# Patient Record
Sex: MALE | Race: BLACK OR AFRICAN AMERICAN | NOT HISPANIC OR LATINO | Employment: FULL TIME | URBAN - METROPOLITAN AREA
[De-identification: names, ages, dates, MRNs, and addresses within clinical notes are randomized per-mention and may not be internally consistent; named-entity substitution may affect disease eponyms.]

---

## 2024-09-06 ENCOUNTER — APPOINTMENT (OUTPATIENT)
Dept: CARDIOLOGY | Facility: HOSPITAL | Age: 34
End: 2024-09-06

## 2024-09-06 ENCOUNTER — HOSPITAL ENCOUNTER (EMERGENCY)
Facility: HOSPITAL | Age: 34
Discharge: OTHER INSTITUTION | End: 2024-09-07
Attending: EMERGENCY MEDICINE

## 2024-09-06 DIAGNOSIS — R11.2 NAUSEA AND VOMITING, UNSPECIFIED VOMITING TYPE: ICD-10-CM

## 2024-09-06 DIAGNOSIS — F10.929 ALCOHOLIC INTOXICATION WITH COMPLICATION (CMS-HCC): ICD-10-CM

## 2024-09-06 DIAGNOSIS — R45.851 SUICIDAL IDEATION: Primary | ICD-10-CM

## 2024-09-06 DIAGNOSIS — F10.10 ALCOHOL ABUSE: ICD-10-CM

## 2024-09-06 LAB
ALBUMIN SERPL-MCNC: 4.5 G/DL (ref 3.5–5)
ALP BLD-CCNC: 94 U/L (ref 35–125)
ALT SERPL-CCNC: 19 U/L (ref 5–40)
AMPHETAMINES UR QL SCN>1000 NG/ML: NEGATIVE
ANION GAP SERPL CALC-SCNC: >19 MMOL/L
APPEARANCE UR: CLEAR
AST SERPL-CCNC: 34 U/L (ref 5–40)
BARBITURATES UR QL SCN>300 NG/ML: POSITIVE
BASOPHILS # BLD MANUAL: 0 X10*3/UL (ref 0–0.1)
BASOPHILS NFR BLD MANUAL: 0 %
BENZODIAZ UR QL SCN>300 NG/ML: NEGATIVE
BILIRUB SERPL-MCNC: 0.2 MG/DL (ref 0.1–1.2)
BILIRUB UR STRIP.AUTO-MCNC: NEGATIVE MG/DL
BUN SERPL-MCNC: 12 MG/DL (ref 8–25)
BZE UR QL SCN>300 NG/ML: NEGATIVE
CALCIUM SERPL-MCNC: 8.4 MG/DL (ref 8.5–10.4)
CANNABINOIDS UR QL SCN>50 NG/ML: POSITIVE
CHLORIDE SERPL-SCNC: 101 MMOL/L (ref 97–107)
CO2 SERPL-SCNC: 21 MMOL/L (ref 24–31)
COLOR UR: ABNORMAL
CREAT SERPL-MCNC: 0.9 MG/DL (ref 0.4–1.6)
EGFRCR SERPLBLD CKD-EPI 2021: >90 ML/MIN/1.73M*2
EOSINOPHIL # BLD MANUAL: 0.52 X10*3/UL (ref 0–0.7)
EOSINOPHIL NFR BLD MANUAL: 5 %
ERYTHROCYTE [DISTWIDTH] IN BLOOD BY AUTOMATED COUNT: 11.9 % (ref 11.5–14.5)
ETHANOL SERPL-MCNC: 0.05 G/DL
ETHANOL SERPL-MCNC: 0.41 G/DL
FENTANYL+NORFENTANYL UR QL SCN: NEGATIVE
GLUCOSE SERPL-MCNC: 78 MG/DL (ref 65–99)
GLUCOSE UR STRIP.AUTO-MCNC: NORMAL MG/DL
HCT VFR BLD AUTO: 45.6 % (ref 41–52)
HGB BLD-MCNC: 16 G/DL (ref 13.5–17.5)
HOLD SPECIMEN: NORMAL
IMM GRANULOCYTES # BLD AUTO: 0.02 X10*3/UL (ref 0–0.7)
IMM GRANULOCYTES NFR BLD AUTO: 0.2 % (ref 0–0.9)
KETONES UR STRIP.AUTO-MCNC: ABNORMAL MG/DL
LEUKOCYTE ESTERASE UR QL STRIP.AUTO: NEGATIVE
LIPASE SERPL-CCNC: 50 U/L (ref 16–63)
LYMPHOCYTES # BLD MANUAL: 4.74 X10*3/UL (ref 1.2–4.8)
LYMPHOCYTES NFR BLD MANUAL: 46 %
MCH RBC QN AUTO: 31 PG (ref 26–34)
MCHC RBC AUTO-ENTMCNC: 35.1 G/DL (ref 32–36)
MCV RBC AUTO: 88 FL (ref 80–100)
METHADONE UR QL SCN>300 NG/ML: NEGATIVE
MONOCYTES # BLD MANUAL: 0.41 X10*3/UL (ref 0.1–1)
MONOCYTES NFR BLD MANUAL: 4 %
MUCOUS THREADS #/AREA URNS AUTO: NORMAL /LPF
NEUTS SEG # BLD MANUAL: 4.12 X10*3/UL (ref 1.2–7)
NEUTS SEG NFR BLD MANUAL: 40 %
NITRITE UR QL STRIP.AUTO: NEGATIVE
NRBC BLD-RTO: 0 /100 WBCS (ref 0–0)
OPIATES UR QL SCN>300 NG/ML: NEGATIVE
OXYCODONE UR QL: NEGATIVE
PCP UR QL SCN>25 NG/ML: NEGATIVE
PH UR STRIP.AUTO: 5 [PH]
PLATELET # BLD AUTO: 376 X10*3/UL (ref 150–450)
POTASSIUM SERPL-SCNC: 3.8 MMOL/L (ref 3.4–5.1)
PROT SERPL-MCNC: 7.5 G/DL (ref 5.9–7.9)
PROT UR STRIP.AUTO-MCNC: ABNORMAL MG/DL
RBC # BLD AUTO: 5.16 X10*6/UL (ref 4.5–5.9)
RBC # UR STRIP.AUTO: NEGATIVE /UL
RBC #/AREA URNS AUTO: NORMAL /HPF
RBC MORPH BLD: ABNORMAL
SODIUM SERPL-SCNC: 144 MMOL/L (ref 133–145)
SP GR UR STRIP.AUTO: 1.02
TOTAL CELLS COUNTED BLD: 100
UROBILINOGEN UR STRIP.AUTO-MCNC: NORMAL MG/DL
VARIANT LYMPHS # BLD MANUAL: 0.52 X10*3/UL (ref 0–0.5)
VARIANT LYMPHS NFR BLD: 5 %
WBC # BLD AUTO: 10.3 X10*3/UL (ref 4.4–11.3)
WBC #/AREA URNS AUTO: NORMAL /HPF

## 2024-09-06 PROCEDURE — S4991 NICOTINE PATCH NONLEGEND: HCPCS | Performed by: EMERGENCY MEDICINE

## 2024-09-06 PROCEDURE — 90839 PSYTX CRISIS INITIAL 60 MIN: CPT

## 2024-09-06 PROCEDURE — 85007 BL SMEAR W/DIFF WBC COUNT: CPT | Performed by: EMERGENCY MEDICINE

## 2024-09-06 PROCEDURE — 2500000005 HC RX 250 GENERAL PHARMACY W/O HCPCS: Performed by: EMERGENCY MEDICINE

## 2024-09-06 PROCEDURE — 82077 ASSAY SPEC XCP UR&BREATH IA: CPT | Performed by: EMERGENCY MEDICINE

## 2024-09-06 PROCEDURE — 36415 COLL VENOUS BLD VENIPUNCTURE: CPT | Performed by: EMERGENCY MEDICINE

## 2024-09-06 PROCEDURE — 99285 EMERGENCY DEPT VISIT HI MDM: CPT | Mod: 25

## 2024-09-06 PROCEDURE — 81001 URINALYSIS AUTO W/SCOPE: CPT | Performed by: EMERGENCY MEDICINE

## 2024-09-06 PROCEDURE — 80307 DRUG TEST PRSMV CHEM ANLYZR: CPT | Performed by: EMERGENCY MEDICINE

## 2024-09-06 PROCEDURE — 80053 COMPREHEN METABOLIC PANEL: CPT | Performed by: EMERGENCY MEDICINE

## 2024-09-06 PROCEDURE — 96375 TX/PRO/DX INJ NEW DRUG ADDON: CPT

## 2024-09-06 PROCEDURE — 2500000001 HC RX 250 WO HCPCS SELF ADMINISTERED DRUGS (ALT 637 FOR MEDICARE OP): Performed by: EMERGENCY MEDICINE

## 2024-09-06 PROCEDURE — 93005 ELECTROCARDIOGRAM TRACING: CPT

## 2024-09-06 PROCEDURE — 85027 COMPLETE CBC AUTOMATED: CPT | Performed by: EMERGENCY MEDICINE

## 2024-09-06 PROCEDURE — 2500000004 HC RX 250 GENERAL PHARMACY W/ HCPCS (ALT 636 FOR OP/ED): Performed by: EMERGENCY MEDICINE

## 2024-09-06 PROCEDURE — 96361 HYDRATE IV INFUSION ADD-ON: CPT

## 2024-09-06 PROCEDURE — 2500000004 HC RX 250 GENERAL PHARMACY W/ HCPCS (ALT 636 FOR OP/ED)

## 2024-09-06 PROCEDURE — 83690 ASSAY OF LIPASE: CPT | Performed by: EMERGENCY MEDICINE

## 2024-09-06 PROCEDURE — 2500000002 HC RX 250 W HCPCS SELF ADMINISTERED DRUGS (ALT 637 FOR MEDICARE OP, ALT 636 FOR OP/ED): Performed by: EMERGENCY MEDICINE

## 2024-09-06 PROCEDURE — 96374 THER/PROPH/DIAG INJ IV PUSH: CPT

## 2024-09-06 RX ORDER — FOLIC ACID 1 MG/1
1 TABLET ORAL ONCE
Status: COMPLETED | OUTPATIENT
Start: 2024-09-06 | End: 2024-09-06

## 2024-09-06 RX ORDER — MULTIVIT-MIN/IRON FUM/FOLIC AC 7.5 MG-4
1 TABLET ORAL ONCE
Status: COMPLETED | OUTPATIENT
Start: 2024-09-06 | End: 2024-09-06

## 2024-09-06 RX ORDER — LANOLIN ALCOHOL/MO/W.PET/CERES
100 CREAM (GRAM) TOPICAL ONCE
Status: COMPLETED | OUTPATIENT
Start: 2024-09-06 | End: 2024-09-06

## 2024-09-06 RX ORDER — IBUPROFEN 200 MG
1 TABLET ORAL ONCE
Status: DISCONTINUED | OUTPATIENT
Start: 2024-09-06 | End: 2024-09-07 | Stop reason: HOSPADM

## 2024-09-06 RX ORDER — ONDANSETRON HYDROCHLORIDE 2 MG/ML
4 INJECTION, SOLUTION INTRAVENOUS ONCE
Status: COMPLETED | OUTPATIENT
Start: 2024-09-06 | End: 2024-09-06

## 2024-09-06 RX ORDER — ONDANSETRON 4 MG/1
4 TABLET, ORALLY DISINTEGRATING ORAL ONCE
Status: COMPLETED | OUTPATIENT
Start: 2024-09-06 | End: 2024-09-06

## 2024-09-06 RX ORDER — LORAZEPAM 0.5 MG/1
0.5 TABLET ORAL ONCE AS NEEDED
Status: DISCONTINUED | OUTPATIENT
Start: 2024-09-06 | End: 2024-09-07 | Stop reason: HOSPADM

## 2024-09-06 RX ORDER — LORAZEPAM 1 MG/1
2 TABLET ORAL ONCE AS NEEDED
Status: COMPLETED | OUTPATIENT
Start: 2024-09-06 | End: 2024-09-06

## 2024-09-06 RX ORDER — LORAZEPAM 1 MG/1
2 TABLET ORAL ONCE AS NEEDED
Status: DISCONTINUED | OUTPATIENT
Start: 2024-09-06 | End: 2024-09-07 | Stop reason: HOSPADM

## 2024-09-06 RX ORDER — FAMOTIDINE 10 MG/ML
20 INJECTION INTRAVENOUS ONCE
Status: COMPLETED | OUTPATIENT
Start: 2024-09-06 | End: 2024-09-06

## 2024-09-06 RX ORDER — ONDANSETRON HYDROCHLORIDE 2 MG/ML
INJECTION, SOLUTION INTRAVENOUS
Status: COMPLETED
Start: 2024-09-06 | End: 2024-09-06

## 2024-09-06 RX ORDER — LORAZEPAM 1 MG/1
1 TABLET ORAL ONCE AS NEEDED
Status: COMPLETED | OUTPATIENT
Start: 2024-09-06 | End: 2024-09-06

## 2024-09-06 RX ORDER — LORAZEPAM 1 MG/1
1 TABLET ORAL ONCE AS NEEDED
Status: DISCONTINUED | OUTPATIENT
Start: 2024-09-06 | End: 2024-09-07 | Stop reason: HOSPADM

## 2024-09-06 RX ORDER — LORAZEPAM 0.5 MG/1
0.5 TABLET ORAL ONCE AS NEEDED
Status: COMPLETED | OUTPATIENT
Start: 2024-09-06 | End: 2024-09-06

## 2024-09-06 RX ADMIN — Medication 1 PATCH: at 20:08

## 2024-09-06 RX ADMIN — LORAZEPAM 2 MG: 1 TABLET ORAL at 19:54

## 2024-09-06 RX ADMIN — Medication 1 TABLET: at 06:32

## 2024-09-06 RX ADMIN — ONDANSETRON HYDROCHLORIDE 4 MG: 2 INJECTION, SOLUTION INTRAVENOUS at 01:59

## 2024-09-06 RX ADMIN — FOLIC ACID 1 MG: 1 TABLET ORAL at 06:32

## 2024-09-06 RX ADMIN — LORAZEPAM 1 MG: 1 TABLET ORAL at 21:55

## 2024-09-06 RX ADMIN — SODIUM CHLORIDE 1000 ML: 900 INJECTION, SOLUTION INTRAVENOUS at 02:38

## 2024-09-06 RX ADMIN — Medication 100 MG: at 06:32

## 2024-09-06 RX ADMIN — LORAZEPAM 2 MG: 1 TABLET ORAL at 18:57

## 2024-09-06 RX ADMIN — LORAZEPAM 1 MG: 1 TABLET ORAL at 06:32

## 2024-09-06 RX ADMIN — LORAZEPAM 0.5 MG: 0.5 TABLET ORAL at 16:04

## 2024-09-06 RX ADMIN — FAMOTIDINE 20 MG: 10 INJECTION, SOLUTION INTRAVENOUS at 02:37

## 2024-09-06 RX ADMIN — ONDANSETRON 4 MG: 2 INJECTION INTRAMUSCULAR; INTRAVENOUS at 01:59

## 2024-09-06 RX ADMIN — ONDANSETRON 4 MG: 4 TABLET, ORALLY DISINTEGRATING ORAL at 20:07

## 2024-09-06 RX ADMIN — LORAZEPAM 0.5 MG: 0.5 TABLET ORAL at 13:49

## 2024-09-06 SDOH — HEALTH STABILITY: MENTAL HEALTH: HAVE YOU EVER DONE ANYTHING, STARTED TO DO ANYTHING, OR PREPARED TO DO ANYTHING TO END YOUR LIFE?: NO

## 2024-09-06 SDOH — HEALTH STABILITY: MENTAL HEALTH: HAVE YOU WISHED YOU WERE DEAD OR WISHED YOU COULD GO TO SLEEP AND NOT WAKE UP?: YES

## 2024-09-06 SDOH — HEALTH STABILITY: MENTAL HEALTH: SUICIDE ASSESSMENT: ADULT (C-SSRS)

## 2024-09-06 SDOH — HEALTH STABILITY: MENTAL HEALTH: ACTIVE SUICIDAL IDEATION WITH SPECIFIC PLAN AND INTENT (PAST 1 MONTH): NO

## 2024-09-06 SDOH — HEALTH STABILITY: MENTAL HEALTH: NON-SPECIFIC ACTIVE SUICIDAL THOUGHTS (PAST 1 MONTH): NO

## 2024-09-06 SDOH — HEALTH STABILITY: MENTAL HEALTH: NEEDS EXPRESSED: OTHER (COMMENT)

## 2024-09-06 SDOH — HEALTH STABILITY: MENTAL HEALTH: ARE YOU HAVING THOUGHTS OF KILLING YOURSELF RIGHT NOW?: NO

## 2024-09-06 SDOH — HEALTH STABILITY: MENTAL HEALTH: HAVE YOU ACTUALLY HAD ANY THOUGHTS OF KILLING YOURSELF?: NO

## 2024-09-06 SDOH — HEALTH STABILITY: MENTAL HEALTH

## 2024-09-06 SDOH — ECONOMIC STABILITY: HOUSING INSECURITY: FEELS SAFE LIVING IN HOME: YES

## 2024-09-06 SDOH — HEALTH STABILITY: MENTAL HEALTH: SUICIDAL BEHAVIOR (LIFETIME): NO

## 2024-09-06 SDOH — HEALTH STABILITY: MENTAL HEALTH: IN THE PAST WEEK, HAVE YOU BEEN HAVING THOUGHTS ABOUT KILLING YOURSELF?: NO

## 2024-09-06 SDOH — HEALTH STABILITY: MENTAL HEALTH: IN THE PAST FEW WEEKS, HAVE YOU FELT THAT YOU OR YOUR FAMILY WOULD BE BETTER OFF IF YOU WERE DEAD?: NO

## 2024-09-06 SDOH — HEALTH STABILITY: MENTAL HEALTH: IN THE PAST FEW WEEKS, HAVE YOU WISHED YOU WERE DEAD?: NO

## 2024-09-06 SDOH — HEALTH STABILITY: MENTAL HEALTH: BEHAVIORS/MOOD: TEARFUL;COOPERATIVE;CALM

## 2024-09-06 SDOH — HEALTH STABILITY: MENTAL HEALTH: WISH TO BE DEAD (PAST 1 MONTH): NO

## 2024-09-06 SDOH — HEALTH STABILITY: MENTAL HEALTH: CONTENT: BLAMING SELF

## 2024-09-06 SDOH — HEALTH STABILITY: MENTAL HEALTH: HAVE YOU EVER TRIED TO KILL YOURSELF?: NO

## 2024-09-06 SDOH — HEALTH STABILITY: MENTAL HEALTH: ACTIVE SUICIDAL IDEATION WITH SOME INTENT TO ACT, WITHOUT SPECIFIC PLAN (PAST 1 MONTH): NO

## 2024-09-06 ASSESSMENT — LIFESTYLE VARIABLES
TREMOR: MODERATE, WITH PATIENT'S ARMS EXTENDED
PAROXYSMAL SWEATS: BARELY PERCEPTIBLE SWEATING, PALMS MOIST
ANXIETY: MODERATELY ANXIOUS, OR GUARDED, SO ANXIETY IS INFERRED
AGITATION: NORMAL ACTIVITY
TREMOR: MODERATE, WITH PATIENT'S ARMS EXTENDED
TREMOR: MODERATE, WITH PATIENT'S ARMS EXTENDED
TREMOR: 2
HEADACHE, FULLNESS IN HEAD: NOT PRESENT
VISUAL DISTURBANCES: NOT PRESENT
ANXIETY: NO ANXIETY, AT EASE
AUDITORY DISTURBANCES: NOT PRESENT
AUDITORY DISTURBANCES: NOT PRESENT
HEADACHE, FULLNESS IN HEAD: VERY MILD
PAROXYSMAL SWEATS: NO SWEAT VISIBLE
PRESCIPTION_ABUSE_PAST_12_MONTHS: NO
NAUSEA AND VOMITING: NO NAUSEA AND NO VOMITING
ANXIETY: MILDLY ANXIOUS
AUDITORY DISTURBANCES: NOT PRESENT
PAROXYSMAL SWEATS: NO SWEAT VISIBLE
AGITATION: NORMAL ACTIVITY
BLOOD PRESSURE: 114/65
NAUSEA AND VOMITING: 2
ORIENTATION AND CLOUDING OF SENSORIUM: ORIENTED AND CAN DO SERIAL ADDITIONS
AGITATION: NORMAL ACTIVITY
PAROXYSMAL SWEATS: NO SWEAT VISIBLE
TOTAL SCORE: 2
TREMOR: MODERATE, WITH PATIENT'S ARMS EXTENDED
TREMOR: NO TREMOR
TACTILE DISTURBANCES: VERY MILD ITCHING, PINS AND NEEDLES, BURNING OR NUMBNESS
PULSE: 91
NAUSEA AND VOMITING: 3
BLOOD PRESSURE: 130/87
AUDITORY DISTURBANCES: NOT PRESENT
HEADACHE, FULLNESS IN HEAD: MILD
ANXIETY: 2
VISUAL DISTURBANCES: MODERATELY SEVERE HALLUCINATIONS
BLOOD PRESSURE: 115/62
PAROXYSMAL SWEATS: NO SWEAT VISIBLE
ANXIETY: NO ANXIETY, AT EASE
TOTAL SCORE: 12
PULSE: 103
BLOOD PRESSURE: 116/74
AGITATION: NORMAL ACTIVITY
AGITATION: NORMAL ACTIVITY
ORIENTATION AND CLOUDING OF SENSORIUM: ORIENTED AND CAN DO SERIAL ADDITIONS
NAUSEA AND VOMITING: MILD NAUSEA WITH NO VOMITING
AUDITORY DISTURBANCES: NOT PRESENT
AUDITORY DISTURBANCES: NOT PRESENT
TREMOR: 3
HEADACHE, FULLNESS IN HEAD: NOT PRESENT
ORIENTATION AND CLOUDING OF SENSORIUM: ORIENTED AND CAN DO SERIAL ADDITIONS
PAROXYSMAL SWEATS: NO SWEAT VISIBLE
PULSE: 93
ANXIETY: NO ANXIETY, AT EASE
HEADACHE, FULLNESS IN HEAD: VERY MILD
HEADACHE, FULLNESS IN HEAD: MILD
PULSE: 87
AGITATION: NORMAL ACTIVITY
ANXIETY: 3
VISUAL DISTURBANCES: MILD SENSITIVITY
PAROXYSMAL SWEATS: BARELY PERCEPTIBLE SWEATING, PALMS MOIST
HEADACHE, FULLNESS IN HEAD: NOT PRESENT
PULSE: 75
TOTAL SCORE: 11
AGITATION: NORMAL ACTIVITY
VISUAL DISTURBANCES: NOT PRESENT
TREMOR: MODERATE, WITH PATIENT'S ARMS EXTENDED
VISUAL DISTURBANCES: NOT PRESENT
ORIENTATION AND CLOUDING OF SENSORIUM: ORIENTED AND CAN DO SERIAL ADDITIONS
AGITATION: NORMAL ACTIVITY
ORIENTATION AND CLOUDING OF SENSORIUM: ORIENTED AND CAN DO SERIAL ADDITIONS
ANXIETY: MILDLY ANXIOUS
AUDITORY DISTURBANCES: NOT PRESENT
BLOOD PRESSURE: 114/85
NAUSEA AND VOMITING: 3
NAUSEA AND VOMITING: MILD NAUSEA WITH NO VOMITING
BLOOD PRESSURE: 140/89
PAROXYSMAL SWEATS: NO SWEAT VISIBLE
AUDITORY DISTURBANCES: NOT PRESENT
HEADACHE, FULLNESS IN HEAD: MILD
TREMOR: MODERATE, WITH PATIENT'S ARMS EXTENDED
PULSE: 100
TOTAL SCORE: 9
NAUSEA AND VOMITING: NO NAUSEA AND NO VOMITING
AUDITORY DISTURBANCES: NOT PRESENT
ANXIETY: MILDLY ANXIOUS
ORIENTATION AND CLOUDING OF SENSORIUM: ORIENTED AND CAN DO SERIAL ADDITIONS
ORIENTATION AND CLOUDING OF SENSORIUM: ORIENTED AND CAN DO SERIAL ADDITIONS
TOTAL SCORE: 10
TOTAL SCORE: 15
VISUAL DISTURBANCES: MODERATELY SEVERE HALLUCINATIONS
NAUSEA AND VOMITING: MILD NAUSEA WITH NO VOMITING
HEADACHE, FULLNESS IN HEAD: NOT PRESENT
PAROXYSMAL SWEATS: NO SWEAT VISIBLE
TACTILE DISTURBANCES: VERY MILD ITCHING, PINS AND NEEDLES, BURNING OR NUMBNESS
TACTILE DISTURBANCES: MILD ITCHING, PINS AND NEEDLES, BURNING OR NUMBNESS
ORIENTATION AND CLOUDING OF SENSORIUM: ORIENTED AND CAN DO SERIAL ADDITIONS
SUBSTANCE_ABUSE_PAST_12_MONTHS: YES
VISUAL DISTURBANCES: NOT PRESENT
TOTAL SCORE: 6
TOTAL SCORE: 7
NAUSEA AND VOMITING: MILD NAUSEA WITH NO VOMITING
VISUAL DISTURBANCES: NOT PRESENT
TOTAL SCORE: 5
ORIENTATION AND CLOUDING OF SENSORIUM: ORIENTED AND CAN DO SERIAL ADDITIONS
BLOOD PRESSURE: 131/91
VISUAL DISTURBANCES: NOT PRESENT
AGITATION: NORMAL ACTIVITY
PULSE: 96

## 2024-09-06 ASSESSMENT — PAIN SCALES - GENERAL
PAINLEVEL_OUTOF10: 0 - NO PAIN
PAINLEVEL_OUTOF10: 0 - NO PAIN

## 2024-09-06 ASSESSMENT — COLUMBIA-SUICIDE SEVERITY RATING SCALE - C-SSRS
6. HAVE YOU EVER DONE ANYTHING, STARTED TO DO ANYTHING, OR PREPARED TO DO ANYTHING TO END YOUR LIFE?: NO
2. HAVE YOU ACTUALLY HAD ANY THOUGHTS OF KILLING YOURSELF?: YES
6. HAVE YOU EVER DONE ANYTHING, STARTED TO DO ANYTHING, OR PREPARED TO DO ANYTHING TO END YOUR LIFE?: NO
1. SINCE LAST CONTACT, HAVE YOU WISHED YOU WERE DEAD OR WISHED YOU COULD GO TO SLEEP AND NOT WAKE UP?: NO
1. IN THE PAST MONTH, HAVE YOU WISHED YOU WERE DEAD OR WISHED YOU COULD GO TO SLEEP AND NOT WAKE UP?: YES
2. HAVE YOU ACTUALLY HAD ANY THOUGHTS OF KILLING YOURSELF?: NO
4. HAVE YOU HAD THESE THOUGHTS AND HAD SOME INTENTION OF ACTING ON THEM?: NO
5. HAVE YOU STARTED TO WORK OUT OR WORKED OUT THE DETAILS OF HOW TO KILL YOURSELF? DO YOU INTEND TO CARRY OUT THIS PLAN?: NO

## 2024-09-06 ASSESSMENT — PAIN - FUNCTIONAL ASSESSMENT: PAIN_FUNCTIONAL_ASSESSMENT: 0-10

## 2024-09-06 NOTE — ED PROVIDER NOTES
HPI   Chief Complaint   Patient presents with    Suicidal    Psychiatric Evaluation    Acute Intoxication       33-year-old male with no significant past medical history comes to the emergency department by police with a chief complaint of suicidal ideation while intoxicated. Patient arrived to ED via PD after his friends called 911 with concerns based on patient saying he was going to die. Patient denies SI/HI. Patient reports he drinks 3 gallons of Vodka a day for no particular reason. Patient denies drinking to try to kill himself. Patient reports he has felt he wanted to die but has no plan. Patient is stable at this time.  The patient is visibly intoxicated and throwing up.  He denies any pain and denies use of blood thinners and his episodes of emesis are nonbloody and nonbilious.  He notes that he has attempted detox previously and has developed the shakes and has had to be admitted.  He is uncertain if he has ever had a seizure.      History provided by:  Patient and police   used: No            Patient History   No past medical history on file.  No past surgical history on file.  No family history on file.  Social History     Tobacco Use    Smoking status: Not on file    Smokeless tobacco: Not on file   Substance Use Topics    Alcohol use: Not on file    Drug use: Not on file       Physical Exam   ED Triage Vitals [09/06/24 0123]   Temperature Heart Rate Respirations BP   36.3 °C (97.3 °F) (!) 104 19 130/82      Pulse Ox Temp Source Heart Rate Source Patient Position   95 % Oral Monitor --      BP Location FiO2 (%)     -- --       Physical Exam  Vitals and nursing note reviewed.   Constitutional:       General: He is not in acute distress.     Appearance: He is well-developed and normal weight. He is ill-appearing and toxic-appearing. He is not diaphoretic.   HENT:      Head: Normocephalic and atraumatic.   Eyes:      Conjunctiva/sclera: Conjunctivae normal.   Cardiovascular:      Rate  and Rhythm: Regular rhythm. Tachycardia present.      Heart sounds: No murmur heard.  Pulmonary:      Effort: Pulmonary effort is normal. No respiratory distress.      Breath sounds: Normal breath sounds.   Abdominal:      General: There is no distension.      Palpations: Abdomen is soft.      Tenderness: There is no abdominal tenderness. There is no guarding or rebound.   Musculoskeletal:         General: No swelling.      Cervical back: Neck supple.   Skin:     General: Skin is warm and dry.   Neurological:      General: No focal deficit present.      Mental Status: He is alert and oriented to person, place, and time.   Psychiatric:         Mood and Affect: Mood normal.           ED Course & MDM   ED Course as of 09/06/24 0826   Fri Sep 06, 2024   0240 ECG 12 lead  ECG performed on September 6, 2024 at 2:39 AM and interpreted by me at 2:40 AM showing a normal sinus rhythm, right axis deviation, intervals within normal limits, no STEMI.  No previous for comparison. [EG]   0623 Alcohol(!!)  Blood alcohol level of 0.407 and will not likely be below the legal limit for close to 15 hours.  He has history of delirium tremens and unknown seizure.  He is placed on a CIWA protocol despite still being intoxicated [EG]   0624 Comprehensive metabolic panel(!)  Patient has an anion gap acidosis likely secondary to alcoholic ketosis.  He is getting IV fluids otherwise his electrolytes are within normal limits. [EG]   0625 CBC and Auto Differential  Within normal limits [EG]   0625 Lipase  Not consistent with pancreatitis [EG]   0625 Urinalysis with Reflex Culture and Microscopic  Still pending [EG]      ED Course User Index  [EG] Cassi Sheriff MD         Diagnoses as of 09/06/24 0826   Suicidal ideation   Alcohol abuse   Alcoholic intoxication with complication (CMS-HCC)   Nausea and vomiting, unspecified vomiting type                 No data recorded     [place]ma Coma Scale Score: 15 (09/06/24 0134 : Yadira Palumbo  RN)                           Medical Decision Making    HPI:  As Above  PMHx/PSHx/Meds/Allergies/SH/FH as per nursing documentation and reviewed.  Review of systems: Total of 10 systems reviewed and otherwise negative except as noted elsewhere    DDX: As described in MDM    If performed, radiology listed above interpreted by me and confirmed by the Radiologist.  Medications administered during this visit (name and route): see MAR  Social determinants of health considered for this visit: lives at home  If performed, EKG interpreted by me and detailed above    [facility] Summary/considerations:  33-year-old male brought in by police after his friends were concerned about his suicidal ideation.  The patient is intoxicated and his mood disturbance and passive thoughts of suicidal ideation may be substance-induced.  The patient needs to be reassessed when clinically sober.  He has a history of alcohol withdrawal symptoms and placed on a CIWA protocol.  At time of handoff the patient's vital signs are within normal limits and nausea and vomiting have improved and he has not developed any abdominal pain.  The patient is pending sobriety for psychiatric evaluation and otherwise medically cleared.  Care is handed off to the day team with Dr. Nuñez    The patient was seen and triaged by our nursing/medic staff, their vitals were taken and the staff notes were reviewed.  If the patient arrived by an EMS squad or an outside agency, we discussed the case with transporting EMS medic, police, or other historians. My initial assessment was attention to their airway, breathing, and circulatory status.  We addressed any immediate or life threatening findings and completed a medical history and a physical exam if the patient or those legally responsible were in agreement with this.     **Disclaimer:  This note was dictated by speech recognition technology.  Minor errors in transcription may be present.  Please contact for clarification or  corrections.      Amount and/or Complexity of Data Reviewed  Labs: ordered. Decision-making details documented in ED Course.  ECG/medicine tests: ordered and independent interpretation performed. Decision-making details documented in ED Course.        Procedure  Procedures     Cassi Sheriff MD  09/06/24 7225

## 2024-09-06 NOTE — ED TRIAGE NOTES
Patient arrived to ED via PD after his friends called 911 with concerns based on patient saying he was going to die. Patient denies SI/HI. Patient reports he drinks 3 gallons of Vodka a day for no particular reason. Patient denies drinking to try to kill himself. Patient reports he has felt he wanted to die but has no plan. Patient is stable at this time.

## 2024-09-06 NOTE — ED NOTES
Pt admits to self sabotage he put his 2 weeks in at his job for no reason. He says he starts drinking then countinues drinking to cause harm.     Mague Ramos RN  09/06/24 5245

## 2024-09-06 NOTE — PROGRESS NOTES
EPAT - Social Work Psychiatric Assessment    Arrival Details  Mode of Arrival: Ambulance  Admission Source: Home  Admission Type: Involuntary  EPAT Assessment Start Date: 09/06/24  EPAT Assessment Start Time: 1702  Name of : Fanny SCHOFIELD    History of Present Illness  Admission Reason: 32y/o male presented to [facility] ED BIB [facility] on a pink slip due to concern for SI.  HPI: SW reviewed previous records and met with patient to obtain history. Patient reportedly made statements concerning for SI to his friend yesterday while intoxicated. Per pink slip written by PD, patient's friend told them patient stated he wanted to die, would never talk to him again, and would do anything he could to not make it. Patient reportedly had been drinking around two 1/5 bottles of vodka per day for the past 5 days. BAL on arrival to ED 0.407. Patient states he has been active with counseling and medication management at a community mental health agency for the past 3 weeks. Prior to that, denies any mental health treatment history. Denies history of suicide attempts. Patient reports hx alcohol dependence, was recently in detox, and stayed sober for a few days after discharge.     Readmission Information   Readmission within 30 Days: No                   Past Psychiatric History/Meds/Treatments  Past Psychiatric History: Patient denies hx mental health diagnosis, but did recently begin seeing mental health providers at [facility]'s recommendation 3 weeks ago.  Past Psychiatric Meds/Treatments: Patient was admitted to [facility] for detox recently, but denies hx inpatient admissions for mental health.         Support System: Immediate family, Friends (brothers, mom, friends)    Living Arrangement: Lives alone    Home Safety  Feels Safe Living in Home: Yes    Income Information  Employment Status for: Patient  Employment Status: Employed    [place] Service/Education History  Current or Previous  Service: None              Drug  Screening  Have you used any substances (canabis, cocaine, heroin, hallucinogens, inhalants, etc.) in the past 12 months?: Yes (thc)  Have you used any prescription drugs other than prescribed in the past 12 months?: No  Is a toxicology screen needed?: Yes    Stage of Change  Stage of Change: Contemplation  History of Treatment: Inpatient  Type of Treatment Offered: Inpatient  Treatment Offered: Resources/education provided, Declined  Frequency of Substance Use: Patient reports drinking up to two 1/5 bottles of vodka daily for approx. the past 10 days.    Psychosocial  Psychosocial (WDL): Within Defined Limits    Orientation  Orientation Level: Appropriate for developmental age    General Appearance  Motor Activity: Tremors  Speech Pattern:  (Unremarkable)  General Attitude: Cooperative  Appearance/Hygiene: Unremarkable    Thought Process  Coherency:  (Unremarkable)  Content: Unremarkable  Delusions:  (None reported/noted)  Perception: Not altered  Hallucination: None  Judgment/Insight: Limited  Confusion: None  Cognition: Appropriate for developmental age    Sleep Pattern  Sleep Pattern: Difficulty falling asleep    Risk Factors  Self Harm/Suicidal Ideation Plan: Denies  Previous Self Harm/Suicidal Plans: Per pink slip, patient's friend told PD patient made statements about wanting to die, would do anything he could to not make it, and would never talk to him again. No plan. Patient himself states he blacked out due to high alcohol consumption, and does not recall any of the statements he made, so is unable to verify.    Violence Risk Assessment  Assessment of Violence: None noted  Thoughts of Harm to Others: No    Ability to Assess Risk Screen  Risk Screen - Ability to Assess: Able to be screened  Ask Suicide-Screening Questions  1. In the past few weeks, have you wished you were dead?: No  2. In the past few weeks, have you felt that you or your family would be better off if you were dead?: No  3. In the past  "week, have you been having thoughts about killing yourself?: No  4. Have you ever tried to kill yourself?: No  5. Are you having thoughts of killing yourself right now?: No  Calculated Risk Score: No intervention is necessary  [place] Suicide Severity Rating Scale (Screener/Recent Self-Report)  1. Wish to be Dead (Past 1 Month): No  2. Non-Specific Active Suicidal Thoughts (Past 1 Month): No  3. Active Suicidal Ideation with any Methods (Not Plan) Without Intent to Act (Past 1 Month): No  4. Active Suicidal Ideation with Some Intent to Act, Without Specific Plan (Past 1 Month): No  5. Active Suicidal Ideation with Specific Plan and Intent (Past 1 Month): No  6. Suicidal Behavior (Lifetime): No  Calculated C-SSRS Risk Score (Lifetime/Recent): No Risk Indicated  Step 1: Risk Factors  Current & Past Psychiatric Dx: Mood disorder  Presenting Symptoms: Anhedonia, Anxiety and/or panic, Insomia  Precipitants/Stressors: Substance intoxication or withdrawal, Triggering events leading to humiliation, shame, and/or despair (e.g. loss of relationship, financial or health status) (real or anticipated)  Change in Treatment: Change in provider or treament (i.e., medications, psychotherapy, milieu)  Access to Lethal Methods : No  Step 5: Documentation  Risk Level: Low suicide risk, Moderate suicide risk    Psychiatric Impression and Plan of Care  Assessment and Plan: SW met FTF with patient once clinically sober to complete eval. Patient calm, cooperative, A&Ox4. Mild tremor noted. Patient verbalizes understanding about the circumstances leading to his ED visit. Patient denies SI at this time, and states he can't recall making the suicidal statements reported by his friend yesterday. Patient states he blacked out at some point from alcohol consumption; BAL on arrival to ED was 0.407. Patient admits to currently feeling overwhelmed, and that \"everything\" is a stressor for him currently. Believes that he \"cares too much\" and \"wants " "to help everybody\". States he was at [facility] recently for detox. States he didn't feel that it was helpful for him, and he relapsed on alcohol around 10 days ago, relates this to feeling like too many stressors occurred at once. Has been drinking around two 1/5 bottles of vodka daily since then. Currently reports feeling very jittery and lightheaded, otherwise denies withdrawal symptoms. Asked what patient does recall about yesterday, and patient states he recalls feeling \"sad\", due to his relapse and knowing he would have to go through the \"process\" of alcohol treatment again. Denies HI/AVH.  Specific Resources Provided to Patient: Peer support met with patient  Plan Comments: SW discussed case with attending provider. Provider does not feel patient is safe for discharge, and is recommending involuntary inpatient psychiatric admission at this time.    Outcome/Disposition  Patient's Perception of Outcome Achieved: disagreement  Assessment, Recommendations and Risk Level Reviewed with: Dr. Hou  Contact Name: n/a  EPAT Assessment Completed Date: 09/06/24  EPAT Assessment Completed Time: 1610  Patient Disposition: Out of network facility (Specify)  Out of Network Reason: Patient requires dual diagnosis treatment      "

## 2024-09-06 NOTE — ED PROVIDER NOTES
Pt care accepted from Dr Nuñez at 1000 with the patient medically cleared and awaiting placement for inpatient mental health care.      The patient is a 33-year-old male presenting to the emergency department on a pink slip signed by law enforcement officers after he reportedly told them he was suicidal.  He reportedly initially called his friends and told them that he was suicidal and he was going to try to kill himself.  He states that he drinks 3 gallons of vodka each day just because he cannot.  He denies any specific suicidal plan at this time.  He denies any homicidal ideation.  He denies any hallucinations.  He denies any headache or visual changes.  No chest pain or shortness of breath.  No abdominal pain.  He states he does have chronic nausea and vomiting but with no hematemesis.  He denies any diarrhea or constipation.  No urinary complaints.  No fever or chills.  No cough or congestion.  He does not know when he last drank but he states it was probably today.  All pertinent positives and negatives are recorded above.  All other systems reviewed and otherwise negative.  Vital signs with mild diastolic hypertension but otherwise within normal limits.  Physical exam with a well-nourished well-developed male with disheveled appearance but no evidence of acute distress.  HEENT exam within normal limits.  He has no evidence of airway compromise or respiratory distress.  Abdominal Kapil is benign.  He does not have any gross motor, neurologic or vascular deficits on exam.  He does have equal pulses bilaterally.      EKG with normal sinus rhythm at 87 bpm, rightward axis, normal voltage, normal ST segment, normal T waves.  There is some mild limitation by motion artifact.      IV Pepcid, IV Zofran, IV fluids, IV banana bag and CIWA protocol ordered.      Diagnostic labs with evidence of substance abuse, alcohol intoxication, electrolyte imbalance and ketonuria but otherwise unremarkable.      Crisis intervention  was consulted and will attempt placement for inpatient mental health care/detox once the patient is sober enough for placement.      Pt care was turned over to Dr Mansfield at 2367 with placement pending for inpt MH care      Impression/diagnosis  Alcohol dependence/intoxication/abuse  Nausea and vomiting, resolved  Diastolic hypertension  Substance abuse  Depression with suicidal ideation      Critical care time billing by me is not indicated at this time      I independently reviewed the results of the EKG and diagnostic labs     Tiffanie Hou MD  09/06/24 6693

## 2024-09-07 VITALS
RESPIRATION RATE: 16 BRPM | TEMPERATURE: 98.3 F | HEART RATE: 85 BPM | SYSTOLIC BLOOD PRESSURE: 122 MMHG | OXYGEN SATURATION: 99 % | WEIGHT: 120 LBS | BODY MASS INDEX: 19.99 KG/M2 | HEIGHT: 65 IN | DIASTOLIC BLOOD PRESSURE: 79 MMHG

## 2024-09-07 ASSESSMENT — LIFESTYLE VARIABLES
PAROXYSMAL SWEATS: BARELY PERCEPTIBLE SWEATING, PALMS MOIST
BLOOD PRESSURE: 122/79
TOTAL SCORE: 4
AUDITORY DISTURBANCES: NOT PRESENT
TREMOR: NOT VISIBLE, BUT CAN BE FELT FINGERTIP TO FINGERTIP
PULSE: 85
ANXIETY: NO ANXIETY, AT EASE
AGITATION: NORMAL ACTIVITY
HEADACHE, FULLNESS IN HEAD: VERY MILD
VISUAL DISTURBANCES: NOT PRESENT
ORIENTATION AND CLOUDING OF SENSORIUM: ORIENTED AND CAN DO SERIAL ADDITIONS
NAUSEA AND VOMITING: MILD NAUSEA WITH NO VOMITING

## 2024-09-09 LAB
ATRIAL RATE: 87 BPM
P AXIS: 83 DEGREES
P OFFSET: 198 MS
P ONSET: 155 MS
PR INTERVAL: 132 MS
Q ONSET: 221 MS
QRS COUNT: 15 BEATS
QRS DURATION: 78 MS
QT INTERVAL: 376 MS
QTC CALCULATION(BAZETT): 452 MS
QTC FREDERICIA: 425 MS
R AXIS: 90 DEGREES
T AXIS: 55 DEGREES
T OFFSET: 409 MS
VENTRICULAR RATE: 87 BPM

## 2024-11-20 ENCOUNTER — HOSPITAL ENCOUNTER (OUTPATIENT)
Facility: HOSPITAL | Age: 34
Setting detail: OBSERVATION
Discharge: HOME | End: 2024-11-22
Attending: EMERGENCY MEDICINE | Admitting: NURSE PRACTITIONER

## 2024-11-20 ENCOUNTER — APPOINTMENT (OUTPATIENT)
Dept: CARDIOLOGY | Facility: HOSPITAL | Age: 34
End: 2024-11-20

## 2024-11-20 DIAGNOSIS — F10.939: Primary | ICD-10-CM

## 2024-11-20 DIAGNOSIS — K59.00 CONSTIPATION, UNSPECIFIED CONSTIPATION TYPE: ICD-10-CM

## 2024-11-20 DIAGNOSIS — R42 DIZZINESS: ICD-10-CM

## 2024-11-20 DIAGNOSIS — F10.10 ALCOHOL ABUSE: ICD-10-CM

## 2024-11-20 DIAGNOSIS — F10.930 ALCOHOL WITHDRAWAL SYNDROME WITHOUT COMPLICATION (MULTI): ICD-10-CM

## 2024-11-20 LAB
ALBUMIN SERPL BCP-MCNC: 4.2 G/DL (ref 3.4–5)
ALP SERPL-CCNC: 85 U/L (ref 33–120)
ALT SERPL W P-5'-P-CCNC: 17 U/L (ref 10–52)
ANION GAP SERPL CALC-SCNC: 16 MMOL/L (ref 10–20)
AST SERPL W P-5'-P-CCNC: 28 U/L (ref 9–39)
BILIRUB SERPL-MCNC: 0.6 MG/DL (ref 0–1.2)
BUN SERPL-MCNC: 6 MG/DL (ref 6–23)
CALCIUM SERPL-MCNC: 8.1 MG/DL (ref 8.6–10.3)
CHLORIDE SERPL-SCNC: 101 MMOL/L (ref 98–107)
CO2 SERPL-SCNC: 28 MMOL/L (ref 21–32)
CREAT SERPL-MCNC: 0.75 MG/DL (ref 0.5–1.3)
EGFRCR SERPLBLD CKD-EPI 2021: >90 ML/MIN/1.73M*2
ERYTHROCYTE [DISTWIDTH] IN BLOOD BY AUTOMATED COUNT: 13.2 % (ref 11.5–14.5)
ETHANOL SERPL-MCNC: 262 MG/DL
GLUCOSE SERPL-MCNC: 90 MG/DL (ref 74–99)
HCT VFR BLD AUTO: 44.9 % (ref 41–52)
HGB BLD-MCNC: 15.8 G/DL (ref 13.5–17.5)
HOLD SPECIMEN: NORMAL
MAGNESIUM SERPL-MCNC: 1.75 MG/DL (ref 1.6–2.4)
MCH RBC QN AUTO: 31.1 PG (ref 26–34)
MCHC RBC AUTO-ENTMCNC: 35.2 G/DL (ref 32–36)
MCV RBC AUTO: 88 FL (ref 80–100)
NRBC BLD-RTO: 0 /100 WBCS (ref 0–0)
PLATELET # BLD AUTO: 334 X10*3/UL (ref 150–450)
POTASSIUM SERPL-SCNC: 3.5 MMOL/L (ref 3.5–5.3)
PROT SERPL-MCNC: 7.2 G/DL (ref 6.4–8.2)
RBC # BLD AUTO: 5.08 X10*6/UL (ref 4.5–5.9)
SODIUM SERPL-SCNC: 141 MMOL/L (ref 136–145)
WBC # BLD AUTO: 7.4 X10*3/UL (ref 4.4–11.3)

## 2024-11-20 PROCEDURE — 83735 ASSAY OF MAGNESIUM: CPT | Performed by: EMERGENCY MEDICINE

## 2024-11-20 PROCEDURE — 36415 COLL VENOUS BLD VENIPUNCTURE: CPT | Performed by: EMERGENCY MEDICINE

## 2024-11-20 PROCEDURE — 2500000001 HC RX 250 WO HCPCS SELF ADMINISTERED DRUGS (ALT 637 FOR MEDICARE OP): Performed by: EMERGENCY MEDICINE

## 2024-11-20 PROCEDURE — 93005 ELECTROCARDIOGRAM TRACING: CPT

## 2024-11-20 PROCEDURE — 2500000004 HC RX 250 GENERAL PHARMACY W/ HCPCS (ALT 636 FOR OP/ED): Performed by: EMERGENCY MEDICINE

## 2024-11-20 PROCEDURE — 96374 THER/PROPH/DIAG INJ IV PUSH: CPT

## 2024-11-20 PROCEDURE — 99291 CRITICAL CARE FIRST HOUR: CPT | Performed by: EMERGENCY MEDICINE

## 2024-11-20 PROCEDURE — 80345 DRUG SCREENING BARBITURATES: CPT | Performed by: EMERGENCY MEDICINE

## 2024-11-20 PROCEDURE — 80053 COMPREHEN METABOLIC PANEL: CPT | Performed by: EMERGENCY MEDICINE

## 2024-11-20 PROCEDURE — 96375 TX/PRO/DX INJ NEW DRUG ADDON: CPT

## 2024-11-20 PROCEDURE — 80359 METHYLENEDIOXYAMPHETAMINES: CPT | Performed by: EMERGENCY MEDICINE

## 2024-11-20 PROCEDURE — 85027 COMPLETE CBC AUTOMATED: CPT | Performed by: EMERGENCY MEDICINE

## 2024-11-20 PROCEDURE — 80307 DRUG TEST PRSMV CHEM ANLYZR: CPT | Performed by: EMERGENCY MEDICINE

## 2024-11-20 PROCEDURE — 82077 ASSAY SPEC XCP UR&BREATH IA: CPT | Performed by: EMERGENCY MEDICINE

## 2024-11-20 PROCEDURE — 80349 CANNABINOIDS NATURAL: CPT | Performed by: EMERGENCY MEDICINE

## 2024-11-20 RX ORDER — FOLIC ACID 1 MG/1
1 TABLET ORAL ONCE
Status: COMPLETED | OUTPATIENT
Start: 2024-11-20 | End: 2024-11-20

## 2024-11-20 RX ORDER — MULTIVIT-MIN/IRON FUM/FOLIC AC 7.5 MG-4
1 TABLET ORAL ONCE
Status: COMPLETED | OUTPATIENT
Start: 2024-11-20 | End: 2024-11-20

## 2024-11-20 RX ORDER — LANOLIN ALCOHOL/MO/W.PET/CERES
100 CREAM (GRAM) TOPICAL DAILY
Status: DISCONTINUED | OUTPATIENT
Start: 2024-11-23 | End: 2024-11-20

## 2024-11-20 RX ORDER — PHENOBARBITAL SODIUM 65 MG/ML
130 INJECTION, SOLUTION INTRAMUSCULAR; INTRAVENOUS
Status: DISCONTINUED | OUTPATIENT
Start: 2024-11-20 | End: 2024-11-21

## 2024-11-20 RX ORDER — THIAMINE HYDROCHLORIDE 100 MG/ML
100 INJECTION, SOLUTION INTRAMUSCULAR; INTRAVENOUS ONCE
Status: COMPLETED | OUTPATIENT
Start: 2024-11-20 | End: 2024-11-20

## 2024-11-20 RX ORDER — PHENOBARBITAL SODIUM 65 MG/ML
260 INJECTION, SOLUTION INTRAMUSCULAR; INTRAVENOUS ONCE
Status: COMPLETED | OUTPATIENT
Start: 2024-11-20 | End: 2024-11-20

## 2024-11-20 ASSESSMENT — ABNORMAL INVOLUNTARY MOVEMENT SCALE (AIMS)
LIPS_PARIETAL: NONE, NORMAL
TONGUE: NONE, NORMAL
UPPER_BODY_EXTREMITIES: NONE, NORMAL
NECK_SHOULDER_HIPS: NONE, NORMAL
AIMS_PATIENT_INCAPACITATION: NONE, NORMAL
JAW: NONE, NORMAL
AIMS_PATIENT_AWARENESS: NO AWARENESS
LOWER_BODY_EXTREMITIES: NONE, NORMAL
FACIAL_EXPRESSION_MUSCLES: NONE, NORMAL

## 2024-11-20 ASSESSMENT — LIFESTYLE VARIABLES
AGITATION: NORMAL ACTIVITY
BLOOD PRESSURE: 125/76
PULSE: 116
AUDITORY DISTURBANCES: NOT PRESENT
TOTAL SCORE: 6
VISUAL DISTURBANCES: VERY MILD SENSITIVITY
ANXIETY: MILDLY ANXIOUS
TREMOR: NO TREMOR
NAUSEA AND VOMITING: 2
HEADACHE, FULLNESS IN HEAD: MILD
ORIENTATION AND CLOUDING OF SENSORIUM: ORIENTED AND CAN DO SERIAL ADDITIONS
PAROXYSMAL SWEATS: NO SWEAT VISIBLE

## 2024-11-20 ASSESSMENT — COLUMBIA-SUICIDE SEVERITY RATING SCALE - C-SSRS
6. HAVE YOU EVER DONE ANYTHING, STARTED TO DO ANYTHING, OR PREPARED TO DO ANYTHING TO END YOUR LIFE?: NO
2. HAVE YOU ACTUALLY HAD ANY THOUGHTS OF KILLING YOURSELF?: NO
1. IN THE PAST MONTH, HAVE YOU WISHED YOU WERE DEAD OR WISHED YOU COULD GO TO SLEEP AND NOT WAKE UP?: NO

## 2024-11-20 ASSESSMENT — PAIN SCALES - GENERAL: PAINLEVEL_OUTOF10: 3

## 2024-11-20 ASSESSMENT — PAIN - FUNCTIONAL ASSESSMENT: PAIN_FUNCTIONAL_ASSESSMENT: 0-10

## 2024-11-20 ASSESSMENT — PAIN DESCRIPTION - PAIN TYPE: TYPE: ACUTE PAIN

## 2024-11-20 ASSESSMENT — PAIN DESCRIPTION - LOCATION: LOCATION: HEAD

## 2024-11-21 PROBLEM — F32.A DEPRESSION: Status: ACTIVE | Noted: 2024-11-21

## 2024-11-21 PROBLEM — F10.10 ALCOHOL ABUSE: Status: ACTIVE | Noted: 2024-11-21

## 2024-11-21 PROBLEM — R44.3 HALLUCINATIONS: Status: ACTIVE | Noted: 2024-11-21

## 2024-11-21 PROBLEM — F10.939: Status: ACTIVE | Noted: 2024-11-21

## 2024-11-21 LAB
AMPHETAMINES UR QL SCN: ABNORMAL
ANION GAP SERPL CALC-SCNC: 17 MMOL/L (ref 10–20)
ATRIAL RATE: 127 BPM
BARBITURATES UR QL SCN: ABNORMAL
BASOPHILS # BLD AUTO: 0.02 X10*3/UL (ref 0–0.1)
BASOPHILS NFR BLD AUTO: 0.3 %
BENZODIAZ UR QL SCN: ABNORMAL
BUN SERPL-MCNC: 6 MG/DL (ref 6–23)
BZE UR QL SCN: ABNORMAL
CALCIUM SERPL-MCNC: 8.7 MG/DL (ref 8.6–10.3)
CANNABINOIDS UR QL SCN: ABNORMAL
CHLORIDE SERPL-SCNC: 101 MMOL/L (ref 98–107)
CO2 SERPL-SCNC: 24 MMOL/L (ref 21–32)
CREAT SERPL-MCNC: 0.7 MG/DL (ref 0.5–1.3)
EGFRCR SERPLBLD CKD-EPI 2021: >90 ML/MIN/1.73M*2
EOSINOPHIL # BLD AUTO: 0.07 X10*3/UL (ref 0–0.7)
EOSINOPHIL NFR BLD AUTO: 1 %
ERYTHROCYTE [DISTWIDTH] IN BLOOD BY AUTOMATED COUNT: 13.2 % (ref 11.5–14.5)
ETHANOL SERPL-MCNC: 45 MG/DL
FENTANYL+NORFENTANYL UR QL SCN: ABNORMAL
GLUCOSE SERPL-MCNC: 98 MG/DL (ref 74–99)
HCT VFR BLD AUTO: 42.5 % (ref 41–52)
HGB BLD-MCNC: 14.6 G/DL (ref 13.5–17.5)
IMM GRANULOCYTES # BLD AUTO: 0.03 X10*3/UL (ref 0–0.7)
IMM GRANULOCYTES NFR BLD AUTO: 0.4 % (ref 0–0.9)
LYMPHOCYTES # BLD AUTO: 1.99 X10*3/UL (ref 1.2–4.8)
LYMPHOCYTES NFR BLD AUTO: 28.1 %
MAGNESIUM SERPL-MCNC: 1.69 MG/DL (ref 1.6–2.4)
MCH RBC QN AUTO: 30.9 PG (ref 26–34)
MCHC RBC AUTO-ENTMCNC: 34.4 G/DL (ref 32–36)
MCV RBC AUTO: 90 FL (ref 80–100)
METHADONE UR QL SCN: ABNORMAL
MONOCYTES # BLD AUTO: 0.98 X10*3/UL (ref 0.1–1)
MONOCYTES NFR BLD AUTO: 13.9 %
NEUTROPHILS # BLD AUTO: 3.98 X10*3/UL (ref 1.2–7.7)
NEUTROPHILS NFR BLD AUTO: 56.3 %
NRBC BLD-RTO: 0 /100 WBCS (ref 0–0)
OPIATES UR QL SCN: ABNORMAL
OXYCODONE+OXYMORPHONE UR QL SCN: ABNORMAL
P AXIS: 76 DEGREES
P OFFSET: 204 MS
P ONSET: 164 MS
PCP UR QL SCN: ABNORMAL
PLATELET # BLD AUTO: 277 X10*3/UL (ref 150–450)
POTASSIUM SERPL-SCNC: 3.7 MMOL/L (ref 3.5–5.3)
PR INTERVAL: 120 MS
Q ONSET: 224 MS
QRS COUNT: 20 BEATS
QRS DURATION: 74 MS
QT INTERVAL: 296 MS
QTC CALCULATION(BAZETT): 430 MS
QTC FREDERICIA: 380 MS
R AXIS: 94 DEGREES
RBC # BLD AUTO: 4.73 X10*6/UL (ref 4.5–5.9)
SODIUM SERPL-SCNC: 138 MMOL/L (ref 136–145)
T AXIS: 47 DEGREES
T OFFSET: 372 MS
VENTRICULAR RATE: 127 BPM
WBC # BLD AUTO: 7.1 X10*3/UL (ref 4.4–11.3)

## 2024-11-21 PROCEDURE — 83735 ASSAY OF MAGNESIUM: CPT | Performed by: HOSPITALIST

## 2024-11-21 PROCEDURE — 2500000004 HC RX 250 GENERAL PHARMACY W/ HCPCS (ALT 636 FOR OP/ED): Performed by: NURSE PRACTITIONER

## 2024-11-21 PROCEDURE — 2500000001 HC RX 250 WO HCPCS SELF ADMINISTERED DRUGS (ALT 637 FOR MEDICARE OP): Performed by: NURSE PRACTITIONER

## 2024-11-21 PROCEDURE — G0378 HOSPITAL OBSERVATION PER HR: HCPCS

## 2024-11-21 PROCEDURE — 96376 TX/PRO/DX INJ SAME DRUG ADON: CPT

## 2024-11-21 PROCEDURE — 36415 COLL VENOUS BLD VENIPUNCTURE: CPT | Performed by: HOSPITALIST

## 2024-11-21 PROCEDURE — 2500000002 HC RX 250 W HCPCS SELF ADMINISTERED DRUGS (ALT 637 FOR MEDICARE OP, ALT 636 FOR OP/ED): Performed by: HOSPITALIST

## 2024-11-21 PROCEDURE — 96375 TX/PRO/DX INJ NEW DRUG ADDON: CPT

## 2024-11-21 PROCEDURE — 2500000004 HC RX 250 GENERAL PHARMACY W/ HCPCS (ALT 636 FOR OP/ED): Performed by: HOSPITALIST

## 2024-11-21 PROCEDURE — 99222 1ST HOSP IP/OBS MODERATE 55: CPT | Performed by: INTERNAL MEDICINE

## 2024-11-21 PROCEDURE — 2500000004 HC RX 250 GENERAL PHARMACY W/ HCPCS (ALT 636 FOR OP/ED): Performed by: EMERGENCY MEDICINE

## 2024-11-21 PROCEDURE — 2500000005 HC RX 250 GENERAL PHARMACY W/O HCPCS: Performed by: HOSPITALIST

## 2024-11-21 PROCEDURE — 96372 THER/PROPH/DIAG INJ SC/IM: CPT | Performed by: NURSE PRACTITIONER

## 2024-11-21 PROCEDURE — 85025 COMPLETE CBC W/AUTO DIFF WBC: CPT | Performed by: HOSPITALIST

## 2024-11-21 PROCEDURE — 80048 BASIC METABOLIC PNL TOTAL CA: CPT | Performed by: HOSPITALIST

## 2024-11-21 PROCEDURE — 2500000001 HC RX 250 WO HCPCS SELF ADMINISTERED DRUGS (ALT 637 FOR MEDICARE OP): Performed by: HOSPITALIST

## 2024-11-21 PROCEDURE — 82077 ASSAY SPEC XCP UR&BREATH IA: CPT | Performed by: NURSE PRACTITIONER

## 2024-11-21 RX ORDER — DIAZEPAM 5 MG/ML
10 INJECTION, SOLUTION INTRAMUSCULAR; INTRAVENOUS EVERY 2 HOUR PRN
Status: DISCONTINUED | OUTPATIENT
Start: 2024-11-21 | End: 2024-11-21

## 2024-11-21 RX ORDER — PANTOPRAZOLE SODIUM 40 MG/1
40 TABLET, DELAYED RELEASE ORAL
Status: DISCONTINUED | OUTPATIENT
Start: 2024-11-21 | End: 2024-11-22 | Stop reason: HOSPADM

## 2024-11-21 RX ORDER — PHENOBARBITAL SODIUM 65 MG/ML
65 INJECTION, SOLUTION INTRAMUSCULAR; INTRAVENOUS EVERY 6 HOURS PRN
Status: DISCONTINUED | OUTPATIENT
Start: 2024-11-21 | End: 2024-11-22 | Stop reason: HOSPADM

## 2024-11-21 RX ORDER — ONDANSETRON 4 MG/1
4 TABLET, ORALLY DISINTEGRATING ORAL EVERY 8 HOURS PRN
Status: DISCONTINUED | OUTPATIENT
Start: 2024-11-21 | End: 2024-11-22 | Stop reason: HOSPADM

## 2024-11-21 RX ORDER — PHENOBARBITAL 32.4 MG/1
32.4 TABLET ORAL 3 TIMES DAILY
Status: DISCONTINUED | OUTPATIENT
Start: 2024-11-25 | End: 2024-11-22 | Stop reason: HOSPADM

## 2024-11-21 RX ORDER — LANOLIN ALCOHOL/MO/W.PET/CERES
100 CREAM (GRAM) TOPICAL DAILY
Status: DISCONTINUED | OUTPATIENT
Start: 2024-11-24 | End: 2024-11-22 | Stop reason: HOSPADM

## 2024-11-21 RX ORDER — PHENOBARBITAL 32.4 MG/1
64.8 TABLET ORAL 3 TIMES DAILY
Status: DISCONTINUED | OUTPATIENT
Start: 2024-11-23 | End: 2024-11-22 | Stop reason: HOSPADM

## 2024-11-21 RX ORDER — DOXEPIN HYDROCHLORIDE 25 MG/1
25 CAPSULE ORAL
COMMUNITY

## 2024-11-21 RX ORDER — FLUOXETINE HYDROCHLORIDE 20 MG/1
20 CAPSULE ORAL DAILY
COMMUNITY

## 2024-11-21 RX ORDER — PHENOBARBITAL 32.4 MG/1
97.2 TABLET ORAL 3 TIMES DAILY
Status: DISCONTINUED | OUTPATIENT
Start: 2024-11-21 | End: 2024-11-22 | Stop reason: HOSPADM

## 2024-11-21 RX ORDER — NALTREXONE HYDROCHLORIDE 50 MG/1
50 TABLET, FILM COATED ORAL DAILY
COMMUNITY

## 2024-11-21 RX ORDER — FOLIC ACID 1 MG/1
1 TABLET ORAL DAILY
Status: DISCONTINUED | OUTPATIENT
Start: 2024-11-21 | End: 2024-11-22 | Stop reason: HOSPADM

## 2024-11-21 RX ORDER — HYDROXYZINE PAMOATE 25 MG/1
25 CAPSULE ORAL 3 TIMES DAILY PRN
COMMUNITY

## 2024-11-21 RX ORDER — ACETAMINOPHEN 325 MG/1
650 TABLET ORAL ONCE
Status: COMPLETED | OUTPATIENT
Start: 2024-11-21 | End: 2024-11-21

## 2024-11-21 RX ORDER — GABAPENTIN 300 MG/1
300 CAPSULE ORAL 3 TIMES DAILY PRN
COMMUNITY

## 2024-11-21 RX ORDER — POTASSIUM CHLORIDE 20 MEQ/1
20 TABLET, EXTENDED RELEASE ORAL ONCE
Status: COMPLETED | OUTPATIENT
Start: 2024-11-21 | End: 2024-11-21

## 2024-11-21 RX ORDER — POLYETHYLENE GLYCOL 3350 17 G/17G
17 POWDER, FOR SOLUTION ORAL DAILY
Status: DISCONTINUED | OUTPATIENT
Start: 2024-11-21 | End: 2024-11-22 | Stop reason: HOSPADM

## 2024-11-21 RX ORDER — PANTOPRAZOLE SODIUM 40 MG/10ML
40 INJECTION, POWDER, LYOPHILIZED, FOR SOLUTION INTRAVENOUS
Status: DISCONTINUED | OUTPATIENT
Start: 2024-11-21 | End: 2024-11-22 | Stop reason: HOSPADM

## 2024-11-21 RX ORDER — ONDANSETRON HYDROCHLORIDE 2 MG/ML
4 INJECTION, SOLUTION INTRAVENOUS EVERY 8 HOURS PRN
Status: DISCONTINUED | OUTPATIENT
Start: 2024-11-21 | End: 2024-11-22 | Stop reason: HOSPADM

## 2024-11-21 RX ORDER — THIAMINE HYDROCHLORIDE 100 MG/ML
100 INJECTION, SOLUTION INTRAMUSCULAR; INTRAVENOUS DAILY
Status: DISCONTINUED | OUTPATIENT
Start: 2024-11-21 | End: 2024-11-22 | Stop reason: HOSPADM

## 2024-11-21 RX ORDER — MULTIVIT-MIN/IRON FUM/FOLIC AC 7.5 MG-4
1 TABLET ORAL DAILY
Status: DISCONTINUED | OUTPATIENT
Start: 2024-11-21 | End: 2024-11-22 | Stop reason: HOSPADM

## 2024-11-21 SDOH — HEALTH STABILITY: MENTAL HEALTH: HAVE YOU ACTUALLY HAD ANY THOUGHTS OF KILLING YOURSELF?: NO

## 2024-11-21 SDOH — HEALTH STABILITY: MENTAL HEALTH: HAVE YOU EVER DONE ANYTHING, STARTED TO DO ANYTHING, OR PREPARED TO DO ANYTHING TO END YOUR LIFE?: NO

## 2024-11-21 SDOH — HEALTH STABILITY: MENTAL HEALTH: BEHAVIORAL HEALTH(WDL): WITHIN DEFINED LIMITS

## 2024-11-21 SDOH — HEALTH STABILITY: MENTAL HEALTH: SUICIDE ASSESSMENT: ADULT (C-SSRS)

## 2024-11-21 SDOH — HEALTH STABILITY: MENTAL HEALTH: HAVE YOU WISHED YOU WERE DEAD OR WISHED YOU COULD GO TO SLEEP AND NOT WAKE UP?: NO

## 2024-11-21 ASSESSMENT — ENCOUNTER SYMPTOMS
MUSCULOSKELETAL NEGATIVE: 1
DIAPHORESIS: 1
NEUROLOGICAL NEGATIVE: 1
NERVOUS/ANXIOUS: 1
RESPIRATORY NEGATIVE: 1
GASTROINTESTINAL NEGATIVE: 1
HALLUCINATIONS: 1
CARDIOVASCULAR NEGATIVE: 1

## 2024-11-21 ASSESSMENT — COGNITIVE AND FUNCTIONAL STATUS - GENERAL
TURNING FROM BACK TO SIDE WHILE IN FLAT BAD: A LITTLE
CLIMB 3 TO 5 STEPS WITH RAILING: A LITTLE
MOVING TO AND FROM BED TO CHAIR: A LITTLE
HELP NEEDED FOR BATHING: A LITTLE
DAILY ACTIVITIY SCORE: 20
DRESSING REGULAR LOWER BODY CLOTHING: A LITTLE
WALKING IN HOSPITAL ROOM: A LITTLE
DAILY ACTIVITIY SCORE: 24
MOBILITY SCORE: 19
PERSONAL GROOMING: A LITTLE
STANDING UP FROM CHAIR USING ARMS: A LITTLE
MOBILITY SCORE: 24
TOILETING: A LITTLE

## 2024-11-21 ASSESSMENT — LIFESTYLE VARIABLES
AUDITORY DISTURBANCES: NOT PRESENT
ANXIETY: MILDLY ANXIOUS
VISUAL DISTURBANCES: NOT PRESENT
TREMOR: 3
TOTAL SCORE: 7
PAROXYSMAL SWEATS: 2
NAUSEA AND VOMITING: NO NAUSEA AND NO VOMITING
ORIENTATION AND CLOUDING OF SENSORIUM: ORIENTED AND CAN DO SERIAL ADDITIONS
HEADACHE, FULLNESS IN HEAD: NOT PRESENT
AGITATION: SOMEWHAT MORE THAN NORMAL ACTIVITY

## 2024-11-21 ASSESSMENT — PAIN SCALES - GENERAL
PAINLEVEL_OUTOF10: 0 - NO PAIN
PAINLEVEL_OUTOF10: 4

## 2024-11-21 ASSESSMENT — PAIN - FUNCTIONAL ASSESSMENT: PAIN_FUNCTIONAL_ASSESSMENT: 0-10

## 2024-11-21 ASSESSMENT — ACTIVITIES OF DAILY LIVING (ADL): LACK_OF_TRANSPORTATION: NO

## 2024-11-21 NOTE — PROGRESS NOTES
----- Message from Sukhjinder Everett PA-C sent at 1/16/2023  8:56 AM CST -----  Pls let pt know her MRI/MRA were both normal - there is no worrisome findings or circulation issues.  Keep f/u as sched  ----- Message -----  From: Tigre Cameron Results In  Sent: 1/14/2023  11:42 AM CST  To: ARMIDA Sierra Ent Result Pool       Pharmacy Medication History     Additional concerns with the patient's PTA list.   Spoke to patient, hasn't taken his medications the past 2 weeks    The following updates were made to the Prior to Admission medication list:     Source of Information:     Medications ADDED:   N/a  Medications CHANGED:  N/a  Medications REMOVED:   N/a  Medications NOT TAKING:   N/a    Allergy reviewed : Yes    Meds 2 Beds : Yes    Comments: n/a     The list below reflectives the updated PTA list. Please review each medication in order reconciliation for additional clarification and justification.    Prior to Admission Medications   Prescriptions Last Dose Informant   FLUoxetine (PROzac) 20 mg capsule Past Week Self   Sig: Take 1 capsule (20 mg) by mouth once daily.   doxepin (SINEquan) 25 mg capsule Past Week Self   Sig: Take 1 capsule (25 mg) by mouth.   gabapentin (Neurontin) 300 mg capsule Past Week Self   Sig: Take 1 capsule (300 mg) by mouth 3 times a day as needed. restlessness   hydrOXYzine pamoate (Vistaril) 25 mg capsule Past Week Self   Sig: Take 1 capsule (25 mg) by mouth 3 times a day as needed for anxiety.   naltrexone (Depade) 50 mg tablet Past Week Self   Sig: Take 1 tablet (50 mg) by mouth once daily.      Facility-Administered Medications: None       The list below reflectives the updated allergy list. Please review each documented allergy for additional clarification and justification.    No Known Allergies       11/21/24 at 12:29 PM - Oly Lizarraga

## 2024-11-21 NOTE — DISCHARGE INSTR - OTHER ORDERS
Your outpatient resources for alcohol dependence are:   Alcoholics Anonymous [phone], [facility] for daily AA meeting locations   [facility] [phone],   [facility] [phone],   [facility] [phone],   [facility] [phone]  [facility] [phone] in [facility] [phone]    [facility] peer recovery services [phone]    [address] [address]  [phone]  Walk-ins/phone calls accepted 24/7/365.    [facility] outpatient programs for mental health and chemical dependency services  [address] [place] [address]   [phone],   or  [address] [place], [place] [address]   [phone]    [facility]  [address] [facility], [address]  [place] [address]  [phone]     [address] for [facility]'s First Call for help, 24/7/365

## 2024-11-21 NOTE — ED PROVIDER NOTES
HPI   Chief Complaint   Patient presents with    Alcohol Intoxication       Is a 33-year-old man with past medical history of depression, alcohol abuse states drinks 2 to 3 gallons of vodka a day.  Did drink much less today.  States he was trying to stop drinking.  Denies any chest or abdominal pain.  Positive tremors per does report a prior history of DTs.            Patient History   Past Medical History:   Diagnosis Date    Depression     ETOH abuse     Seizures (Multi)      History reviewed. No pertinent surgical history.  No family history on file.  Social History     Tobacco Use    Smoking status: Never    Smokeless tobacco: Never   Vaping Use    Vaping status: Some Days   Substance Use Topics    Alcohol use: Not on file    Drug use: Yes     Types: Marijuana       Physical Exam   ED Triage Vitals [11/20/24 2044]   Temperature Heart Rate Respirations BP   37.4 °C (99.3 °F) (!) 128 20 (!) 120/102      Pulse Ox Temp src Heart Rate Source Patient Position   96 % -- Monitor Sitting      BP Location FiO2 (%)     Left arm --       Physical Exam  Vitals and nursing note reviewed.   Constitutional:       Appearance: Normal appearance. He is normal weight.   HENT:      Head: Normocephalic and atraumatic.      Nose: Nose normal.      Mouth/Throat:      Mouth: Mucous membranes are moist.      Pharynx: Oropharynx is clear.   Eyes:      Extraocular Movements: Extraocular movements intact.      Conjunctiva/sclera: Conjunctivae normal.      Pupils: Pupils are equal, round, and reactive to light.   Cardiovascular:      Rate and Rhythm: Regular rhythm. Tachycardia present.      Pulses: Normal pulses.      Heart sounds: Normal heart sounds.   Pulmonary:      Effort: Pulmonary effort is normal.      Breath sounds: Normal breath sounds.   Abdominal:      General: Abdomen is flat. Bowel sounds are normal. There is no distension.      Palpations: Abdomen is soft.      Tenderness: There is no abdominal tenderness. There is no right  CVA tenderness, left CVA tenderness, guarding or rebound.   Musculoskeletal:         General: Normal range of motion.      Cervical back: Normal range of motion and neck supple.   Skin:     General: Skin is warm and dry.      Capillary Refill: Capillary refill takes less than 2 seconds.   Neurological:      General: No focal deficit present.      Mental Status: He is alert and oriented to person, place, and time. Mental status is at baseline.      Sensory: No sensory deficit.      Motor: No weakness.      Comments: Positive tremors tongue fasciculation   Psychiatric:         Mood and Affect: Mood normal.         Thought Content: Thought content normal.         Judgment: Judgment normal.           ED Course & MDM   Diagnoses as of 11/27/24 0011   Alcohol withdrawal syndrome without complication (Multi)                 No data recorded                                 Medical Decision Making  Patient here with concern for alcohol withdrawal.  Does have a fairly extensive drinking history and states he has not drunk that much today.  Does appear to be in alcohol withdrawal.  Positive tachycardia.  No hallucinations.  Positive tremors.  IV is placed and labs are sent and patient is started on phenobarbital given history of DTs.  Will require admission for further management of alcohol withdrawal.    Amount and/or Complexity of Data Reviewed  Labs: ordered. Decision-making details documented in ED Course.  ECG/medicine tests: ordered. Decision-making details documented in ED Course.     Details: EKG interpreted by me showed sinus tachycardia rate of 127 with no acute ischemic changes no STEMI.  No A-fib.        Procedure  Critical Care    Performed by: Daniel Jimenez MD  Authorized by: Daniel Jimenez MD    Critical care provider statement:     Critical care time (minutes):  35    Critical care time was exclusive of:  Separately billable procedures and treating other patients and teaching time    Critical care  was necessary to treat or prevent imminent or life-threatening deterioration of the following conditions:  Toxidrome    Critical care was time spent personally by me on the following activities:  Blood draw for specimens, development of treatment plan with patient or surrogate, discussions with consultants, evaluation of patient's response to treatment, examination of patient, ordering and review of laboratory studies, ordering and performing treatments and interventions, ordering and review of radiographic studies, pulse oximetry, re-evaluation of patient's condition and review of old charts    Care discussed with: admitting provider         Daneil Jimenez MD  11/27/24 0011       Daniel Jimenez MD  11/27/24 0011

## 2024-11-21 NOTE — ED TRIAGE NOTES
Pt states he is coming for alcohol detox. Pt drank a gallon of liquor today. Pt c/o headache, fatigue and tremors.

## 2024-11-21 NOTE — ED NOTES
Received patient at this time from tour 1 RN. Patient pending admission at this time based on bed availability.      Lana Chi RN  11/21/24 0770

## 2024-11-21 NOTE — PROGRESS NOTES
11/21/24 0659   [facility] Disability Status   Are you deaf or do you have serious difficulty hearing? N   Are you blind or do you have serious difficulty seeing, even when wearing glasses? N   Because of a physical, mental, or emotional condition, do you have serious difficulty concentrating, remembering, or making decisions? (5 years old or older) Y  (intoxicated)   Do you have serious difficulty walking or climbing stairs? N   Do you have serious difficulty dressing or bathing? N   Because of a physical, mental, or emotional condition, do you have serious difficulty doing errands alone such as visiting the doctor? Y

## 2024-11-21 NOTE — H&P
"History Of Present Illness  Rene Arshad is a 33 y.o. male presenting with alcohol withdrawal.  Past Medical History:   Diagnosis Date    Depression     ETOH abuse     Seizures (Multi)      He has been drinking progressive amounts of alcohol the past few weeks, up to a few gallons of vodka a day. He has gone through detox before and now wants to try again. He liked [employer]. His last drink was yesterday evening. He has tried to detox at home, but it did not go well. He has history of seizures and hallucinations in setting of withdrawal. He was put on several meds for depression and anxiety, but stopped them on his own a few weeks ago; he doesn't feel they were working and wants to be off of them. He is not currently suicidal. When I saw him, he felt very warm and his thoughts were racing.     History reviewed. No pertinent surgical history.  Social History     Tobacco Use    Smoking status: Never    Smokeless tobacco: Never   Vaping Use    Vaping status: Some Days   Substance Use Topics    Drug use: Yes     Types: Marijuana     No family history on file.  Allergies  Patient has no known allergies.    Review of Systems   Constitutional:  Positive for diaphoresis.   HENT: Negative.     Respiratory: Negative.     Cardiovascular: Negative.    Gastrointestinal: Negative.    Genitourinary: Negative.    Musculoskeletal: Negative.    Skin: Negative.    Neurological: Negative.    Psychiatric/Behavioral:  Positive for hallucinations. The patient is nervous/anxious.        Last Recorded Vitals  Blood pressure (!) 155/91, pulse 69, temperature 36.2 °C (97.1 °F), temperature source Temporal, resp. rate 17, height 1.651 m (5' 5\"), weight 59 kg (130 lb), SpO2 93%.  Physical Exam  Cardiovascular:      Rate and Rhythm: Normal rate and regular rhythm.      Heart sounds: Normal heart sounds.   Pulmonary:      Breath sounds: Normal breath sounds.   Abdominal:      General: Bowel sounds are normal.      Palpations: Abdomen is " soft.   Musculoskeletal:         General: Normal range of motion.   Neurological:      General: No focal deficit present.      Mental Status: He is alert and oriented to person, place, and time.   Psychiatric:         Mood and Affect: Mood normal.           Relevant Results           Assessment/Plan   Assessment & Plan  Alcohol withdrawal, with unspecified complication (Multi)    Alcohol abuse    Depression    Hallucinations    - phenobarb taper  - SW working on inpatient alcohol center  - LA tomorrow if doing better         I spent 50 minutes in the professional and overall care of this patient.      Robert Ballesteros MD

## 2024-11-21 NOTE — PROGRESS NOTES
Transitional Care Coordination Progress Note:  Plan per Medical/Surgical team: treatment of ETOH withdrawal with [facility]   Status: Observation  Payor source: self pay?  Discharge disposition: Home alone, Inpatient rehab?  THRIVE to assist when sober   Potential Barriers: still intoxicated  ADOD: 11/23/2024   AUGUST Small RN, BSN Transitional Care Coordinator ED# [phone]      11/21/24 0659   Discharge Planning   Living Arrangements Alone   Support Systems Family members;Friends/neighbors   Assistance Needed THRIVE for ETOH abuse recovery resources- ?inpatient rehab (when sober)   Type of Residence Private residence   Number of Stairs to Enter Residence 15   Number of Stairs Within Residence 0   Home or Post Acute Services Community services   Expected Discharge Disposition Othe  (possible rehab placement)   Does the patient need discharge transport arranged? Yes   RoundTrip coordination needed? Yes   Has discharge transport been arranged? No   Financial Resource Strain   How hard is it for you to pay for the very basics like food, housing, medical care, and heating? Somewhat   Housing Stability   In the last 12 months, was there a time when you were not able to pay the mortgage or rent on time? N   In the past 12 months, how many times have you moved where you were living? 1   At any time in the past 12 months, were you homeless or living in a shelter (including now)? N   Transportation Needs   In the past 12 months, has lack of transportation kept you from medical appointments or from getting medications? no   In the past 12 months, has lack of transportation kept you from meetings, work, or from getting things needed for daily living? No

## 2024-11-21 NOTE — HOSPITAL COURSE
"[facility] OBSERVATION H&P  HPI: Rene Arshad is a 33 y.o. male, with a PMH of depression, alcohol abuse, who presented to [facility] ED on 11/20/2024 for ETOH detox. Pt drank a gallon of liquor today. Pt c/o headache, fatigue and tremors. Did drink much less today. States he was trying to stop drinking. Denies any chest or abdominal pain.     ED Course: started on phenobarbital given hx of DTs.     Patient History   PMH: He has a past medical history of Depression, ETOH abuse, and Seizures (Multi).  PSH: He has no past surgical history on file.  SH: He reports that he has never smoked. He has never used smokeless tobacco. He reports current drug use. Drug: Marijuana.  FH: family history is not on file.     No Known Allergies  No current outpatient medications  Review of Systems   ROS: 10-point review of systems was performed and is otherwise negative except as noted in HPI.        Heart Rate:  []   Temperature:  [36.8 °C (98.3 °F)-37.4 °C (99.3 °F)]   Respirations:  [16-20]   BP: (120-145)/()   Height:  [165.1 cm (5' 5\")]   Weight:  [59 kg (130 lb)]   Pulse Ox:  [96 %-99 %]      0-10 (Numeric) Pain Score: 0 - No pain   Vitals:    11/20/24 2044   Weight: 59 kg (130 lb)        Physical Exam:  Vitals and nursing notes reviewed.  GENERAL: Alert and awake, cooperative; in no acute distress  SKIN: Warm and dry, cap refill <2  HEENT: Normocephalic, PEERL, mucous membranes pink and moist  CARDIAC: Regular rate and rhythm, S1S2, no murmurs or abnormal heart sounds  CHEST: Normal respiratory effort, no abnormal breath sounds  ABDOMEN: soft, non-distended, non-tender with palpation  EXTREMITIES: No lower extremity edema, normal pulses all 4 extremities  NEURO: Alert and oriented, mental status at baseline, no focal deficits  PSYCH: Behavior and affect as expected     Medications  folic acid, 1 mg, oral, Daily  multivitamin with minerals, 1 tablet, oral, Daily  pantoprazole, 40 mg, oral, Daily before breakfast   " "Or  pantoprazole, 40 mg, intravenous, Daily before breakfast  PHENobarbitaL, 97.2 mg, oral, TID   Followed by  [START ON 2024] PHENobarbitaL, 64.8 mg, oral, TID   Followed by  [START ON 2024] PHENobarbitaL, 32.4 mg, oral, TID  polyethylene glycol, 17 g, oral, Daily  [START ON 2024] thiamine, 100 mg, oral, Daily  thiamine, 100 mg, intravenous, Daily     PRN medications: ondansetron ODT **OR** ondansetron, PHENobarbital    Diagnostic Results   CBC- 2024:  5:16 AM  7.1 14.6 277    42.5      BMP- 2024:  5:16 AM  138 6 _ 98   3.7 0.70 24    Estimated Creatinine Clearance: 125 mL/min (by C-G formula based on SCr of 0.7 mg/dL).     CA: 8.7 PROTIEN: 7.2 ALT: 17 Total Bili: 0.6 M.69   PHOS: _ ALBUMIN: 4.2 AST: 28   Alk Phos: 85      COAGS- No results in last year.  _   _ _     CV Labs  No results found for: \"TROPHS\", \"BNP\", \"HGBA1C\", \"LDL\", \"LDLCALC\"           Assessment/Plan   Rene Arshad is a 33 y.o. male, with a PMH of depression, alcohol abuse, who presented to [facility] ED on 2024 for ETOH detox.    PLAN:  Alcohol Intoxication  - ETOH 262  - AST 28, ALT 17, ALK Phos 85  - RAAS monitoring  - phenobarbitol scheduled and PRN  - folate, thiamine, MVI  - tox screen positive for amphetamines, barbituates, cannabis   - social work consult     Depression  - on no home meds        GI/VTE PPX: PPI, SQ protonix, no DVT ppx due to low risk  Code Status: Full Code    Chart, medical history, and labs/testing reviewed in detail.   Case and plan of care to be discussed with attending provider.      Disposition: Discharge  once medically cleared and stable, pending ETOH management    Pricilla Montero, DINO-CNP   Observation/Internal Med [facility]  [facility]  24  8:45 AM  Total time of {Mins:22564::\"45\"} minutes spent on professional and overall care, with >50% of time dedicated to counseling/coordination of care.   "

## 2024-11-21 NOTE — ED PROCEDURE NOTE
Procedure  Critical Care    Performed by: Daniel Jimenez MD  Authorized by: Daniel Jmienez MD    Critical care provider statement:     Critical care time (minutes):  45    Critical care was necessary to treat or prevent imminent or life-threatening deterioration of the following conditions:  Toxidrome    Critical care was time spent personally by me on the following activities:  Blood draw for specimens, development of treatment plan with patient or surrogate, discussions with consultants, evaluation of patient's response to treatment, examination of patient, ordering and review of laboratory studies, ordering and review of radiographic studies, pulse oximetry, re-evaluation of patient's condition, ordering and performing treatments and interventions and review of old charts    Care discussed with: admitting provider                 Daniel Jimenez MD  11/20/24 8932

## 2024-11-22 VITALS
TEMPERATURE: 97.7 F | DIASTOLIC BLOOD PRESSURE: 92 MMHG | HEART RATE: 89 BPM | OXYGEN SATURATION: 100 % | SYSTOLIC BLOOD PRESSURE: 143 MMHG | HEIGHT: 65 IN | BODY MASS INDEX: 21.66 KG/M2 | WEIGHT: 130 LBS | RESPIRATION RATE: 20 BRPM

## 2024-11-22 PROCEDURE — 2500000001 HC RX 250 WO HCPCS SELF ADMINISTERED DRUGS (ALT 637 FOR MEDICARE OP): Performed by: HOSPITALIST

## 2024-11-22 PROCEDURE — 99232 SBSQ HOSP IP/OBS MODERATE 35: CPT | Performed by: NURSE PRACTITIONER

## 2024-11-22 PROCEDURE — 96376 TX/PRO/DX INJ SAME DRUG ADON: CPT

## 2024-11-22 PROCEDURE — G0378 HOSPITAL OBSERVATION PER HR: HCPCS

## 2024-11-22 PROCEDURE — 2500000004 HC RX 250 GENERAL PHARMACY W/ HCPCS (ALT 636 FOR OP/ED): Performed by: HOSPITALIST

## 2024-11-22 PROCEDURE — 99238 HOSP IP/OBS DSCHRG MGMT 30/<: CPT | Performed by: NURSE PRACTITIONER

## 2024-11-22 RX ORDER — POLYETHYLENE GLYCOL 3350 17 G/17G
17 POWDER, FOR SOLUTION ORAL DAILY
Qty: 30 PACKET | Refills: 0 | Status: SHIPPED | OUTPATIENT
Start: 2024-11-23 | End: 2024-12-23

## 2024-11-22 RX ORDER — MECLIZINE HYDROCHLORIDE 25 MG/1
25 TABLET ORAL 3 TIMES DAILY PRN
Status: DISCONTINUED | OUTPATIENT
Start: 2024-11-22 | End: 2024-11-22 | Stop reason: HOSPADM

## 2024-11-22 RX ORDER — AMOXICILLIN 250 MG
1 CAPSULE ORAL NIGHTLY
Status: DISCONTINUED | OUTPATIENT
Start: 2024-11-22 | End: 2024-11-22 | Stop reason: HOSPADM

## 2024-11-22 RX ORDER — ONDANSETRON 4 MG/1
8 TABLET, ORALLY DISINTEGRATING ORAL EVERY 8 HOURS PRN
Qty: 20 TABLET | Refills: 0 | Status: SHIPPED | OUTPATIENT
Start: 2024-11-22 | End: 2024-11-27

## 2024-11-22 RX ORDER — MECLIZINE HYDROCHLORIDE 25 MG/1
25 TABLET ORAL 3 TIMES DAILY PRN
Qty: 30 TABLET | Refills: 0 | Status: SHIPPED | OUTPATIENT
Start: 2024-11-22 | End: 2024-12-22

## 2024-11-22 RX ORDER — POLYETHYLENE GLYCOL 3350 17 G/17G
17 POWDER, FOR SOLUTION ORAL DAILY
Status: DISCONTINUED | OUTPATIENT
Start: 2024-11-22 | End: 2024-11-22 | Stop reason: SDUPTHER

## 2024-11-22 RX ORDER — AMOXICILLIN 250 MG
1 CAPSULE ORAL NIGHTLY
Qty: 30 TABLET | Refills: 0 | Status: SHIPPED | OUTPATIENT
Start: 2024-11-22 | End: 2024-12-22

## 2024-11-22 RX ORDER — LANOLIN ALCOHOL/MO/W.PET/CERES
100 CREAM (GRAM) TOPICAL DAILY
Qty: 30 TABLET | Refills: 0 | Status: SHIPPED | OUTPATIENT
Start: 2024-11-24 | End: 2024-12-24

## 2024-11-22 NOTE — ASSESSMENT & PLAN NOTE
Rene Arshad is a 32 y/o Male PMH: pharyngitis, presenting to the ED with complaints of alcohol intoxication. Patient subsequently admitted to observation for further monitoring of above symptoms.

## 2024-11-22 NOTE — PROGRESS NOTES
"Rene Arshad is a 33 y.o. male on day 2  of admission presenting with Alcohol withdrawal, with unspecified complication (Multi).    Subjective   Patient endorses feeling better. VSS on RA. Endorses dizzy feeling when walking will trial meclizine this am.     Objective     Physical Exam  Vitals reviewed.   HENT:      Head: Normocephalic.      Right Ear: Tympanic membrane normal.      Nose: Nose normal.      Mouth/Throat:      Mouth: Mucous membranes are moist.   Eyes:      Pupils: Pupils are equal, round, and reactive to light.   Cardiovascular:      Rate and Rhythm: Normal rate.   Pulmonary:      Effort: Pulmonary effort is normal.   Abdominal:      General: Abdomen is flat.   Genitourinary:     Comments: deferred  Musculoskeletal:         General: Normal range of motion.      Cervical back: Normal range of motion.   Skin:     General: Skin is warm.      Capillary Refill: Capillary refill takes less than 2 seconds.      Comments: Left leg tattoo   Neurological:      General: No focal deficit present.      Mental Status: He is alert and oriented to person, place, and time. Mental status is at baseline.   Psychiatric:         Mood and Affect: Mood normal.         Behavior: Behavior normal.         Thought Content: Thought content normal.         Judgment: Judgment normal.         Last Recorded Vitals  Blood pressure 117/77, pulse 69, temperature 36.5 °C (97.7 °F), resp. rate 16, height 1.651 m (5' 5\"), weight 59 kg (130 lb), SpO2 96%.  Intake/Output last 3 Shifts:  I/O last 3 completed shifts:  In: 720 (12.2 mL/kg) [P.O.:720]  Out: - (0 mL/kg)   Weight: 59 kg     Relevant Results  No results found for this or any previous visit (from the past 24 hours).               Assessment/Plan   Assessment & Plan  Alcohol withdrawal, with unspecified complication (Multi)    Alcohol abuse    Depression    Hallucinations  Rene Arshad is a 32 y/o Male PMH: pharyngitis, presenting to the ED with complaints of alcohol " intoxication. Patient subsequently admitted to observation for further monitoring of above symptoms.          #Acute Alcohol withdrawal  -endorses drinking a few gallons of vodka a day usually , reports he only had one bottle of vodka on 11/20   -last alcohol drink 11/20/24*  -NO SI   -alcohol : 45  -RASS protocol  -Phenobarbital taper per protocol  -Vitamin B1 100mg IV x 3 doses      #Constipation  -Add Bowel Regimen        #DVT prophylaxis  -scds      DC plan:  DC home when medically stable , anticipate dc today if remains hemodynamically stable. Patient endorses recently losing his home, his job, would like more information with inpatient alcohol treatment centers, care coordination and Social Work closely following. Interdisciplinary rounding completed with Attending Provider, Bedside RN and TCC.  Labs, results and plan of care discussed and reviewed with Dr. Monahan.       I spent 20 minutes in the professional and overall care of this patient.  Afshan Medina, APRN-CNP

## 2024-11-22 NOTE — PROGRESS NOTES
11/22/24 1115   Discharge Planning   Assistance Needed ETOH abuse recovery resources     CASH spoke with Jordyn from [facility]. [facility] is following and was looking into residential programs for patient. Patient does not currently have insurance, which is a barrier. Jordyn looking into [facility]- they often have grants for patient's without insurance and would help with housing issue. Jordyn states she will meet with patient at bedside.    CASH met with patient at bedside. Patient reported that he would like to discharge home because he needs to collect his belongings and clean house prior to leaving. Patient states he can stay with his brothers short term. Patient states he believes he has an insurance card at home and plans to go to [place], if he can't find insurance card he plans to private pay. Patient states that he would still like information about [facility] just in case, but looked at google reviews and they are not positive. CASH called Jordyn and provided update via . Patient aware that there is a discharge order in and plans to wait to be seen by [facility] prior to leaving. Patient states he will call family member for transportation.

## 2024-11-22 NOTE — DISCHARGE SUMMARY
Discharge Diagnosis  Alcohol withdrawal, with unspecified complication (Multi)    Issues Requiring Follow-Up  N/A    Test Results Pending At Discharge  Pending Labs       Order Current Status    Amphetamine Confirm, Urine In process    Barbiturate, Urine, Confirmation In process    THC (Marijuana), Urine, Confirmation In process            Hospital Course  HPI: Rene Arshad is a 33 y.o. male presenting with alcohol withdrawal.  Medical History        Past Medical History:   Diagnosis Date    Depression      ETOH abuse      Seizures (Multi)           He has been drinking progressive amounts of alcohol the past few weeks, up to a few gallons of vodka a day. He has gone through detox before and now wants to try again. He liked [employer]. His last drink was yesterday evening. He has tried to detox at home, but it did not go well. He has history of seizures and hallucinations in setting of withdrawal. He was put on several meds for depression and anxiety, but stopped them on his own a few weeks ago; he doesn't feel they were working and wants to be off of them. He is not currently suicidal. When I saw him, he felt very warm and his thoughts were racing.       Hospital Course:  As above patient seen in observation for alcohol withdrawal. Patient received a phenobarbital taper while hospitalized. Referrals sent to PCP on discharge. Patient prescriptions sent to pharmacy of choice.Patient discharged in stable condition. Interdisciplinary rounding completed with Attending Provider, Bedside RN and TCC.  Labs, results and plan of care discussed and reviewed with Dr. Steward.      Pertinent Physical Exam At Time of Discharge  Physical Exam  deferred  Home Medications     Medication List      START taking these medications     meclizine 25 mg tablet; Commonly known as: Antivert; Take 1 tablet (25   mg) by mouth 3 times a day as needed for dizziness.   ondansetron ODT 4 mg disintegrating tablet; Commonly known as:    Zofran-ODT; Take 2 tablets (8 mg) by mouth every 8 hours if needed for   nausea for up to 5 days.   polyethylene glycol 17 gram packet; Commonly known as: Glycolax,   Miralax; Take 17 g by mouth once daily.; Start taking on: November 23, 2024   sennosides-docusate sodium 8.6-50 mg tablet; Commonly known as:   Luz Maria-Colace; Take 1 tablet by mouth once daily at bedtime.   thiamine 100 mg tablet; Commonly known as: Vitamin B-1; Take 1 tablet   (100 mg) by mouth once daily. Do not fill before November 24, 2024.; Start   taking on: November 24, 2024     CONTINUE taking these medications     doxepin 25 mg capsule; Commonly known as: SINEquan   FLUoxetine 20 mg capsule; Commonly known as: PROzac   gabapentin 300 mg capsule; Commonly known as: Neurontin   hydrOXYzine pamoate 25 mg capsule; Commonly known as: Vistaril   naltrexone 50 mg tablet; Commonly known as: Depade       Outpatient Follow-Up  No future appointments.    Afshan Medina, DINO-CNP

## 2024-11-22 NOTE — PROGRESS NOTES
11/22/24 1012   Discharge Planning   Assistance Needed TCC;updated by CNP; wanted to get this patient discharged today. He is saying that he is losing his house, wanted to go to inpatient alcohol treatment center. Also is losing his job / health insurance. Inpatient treatment center is private pay- updated as well   Expected Discharge Disposition Othe   Does the patient need discharge transport arranged? Yes   RoundTrip coordination needed? Yes   Has discharge transport been arranged? No

## 2024-11-24 LAB
AMPHET UR-MCNC: >5000 NG/ML
CARBOXYTHC UR-MCNC: >500 NG/ML
MDA UR-MCNC: <200 NG/ML
MDEA UR-MCNC: <200 NG/ML
MDMA UR-MCNC: <200 NG/ML
METHAMPHET UR-MCNC: <200 NG/ML
PHENTERMINE UR CFM-MCNC: <200 NG/ML

## 2024-11-27 LAB
BUTALBITAL, URN, QUANT: <50 NG/ML
PENTOBARBITAL, URN, QUANT: <50 NG/ML
PHENOBARBITAL, URN, QUANT: 3497 NG/ML